# Patient Record
Sex: FEMALE
[De-identification: names, ages, dates, MRNs, and addresses within clinical notes are randomized per-mention and may not be internally consistent; named-entity substitution may affect disease eponyms.]

---

## 2024-02-20 PROBLEM — Z00.00 ENCOUNTER FOR PREVENTIVE HEALTH EXAMINATION: Status: ACTIVE | Noted: 2024-02-20

## 2024-02-22 ENCOUNTER — APPOINTMENT (OUTPATIENT)
Dept: ORTHOPEDIC SURGERY | Facility: CLINIC | Age: 89
End: 2024-02-22
Payer: MEDICARE

## 2024-02-22 VITALS
HEIGHT: 65 IN | SYSTOLIC BLOOD PRESSURE: 168 MMHG | DIASTOLIC BLOOD PRESSURE: 90 MMHG | HEART RATE: 105 BPM | WEIGHT: 123 LBS | BODY MASS INDEX: 20.49 KG/M2

## 2024-02-22 DIAGNOSIS — M25.562 PAIN IN LEFT KNEE: ICD-10-CM

## 2024-02-22 DIAGNOSIS — M89.9 DISORDER OF BONE, UNSPECIFIED: ICD-10-CM

## 2024-02-22 DIAGNOSIS — Q78.6 MULTIPLE CONGENITAL EXOSTOSES: ICD-10-CM

## 2024-02-22 PROCEDURE — 73564 X-RAY EXAM KNEE 4 OR MORE: CPT | Mod: LT

## 2024-02-22 PROCEDURE — 99204 OFFICE O/P NEW MOD 45 MIN: CPT

## 2024-02-23 PROBLEM — Q78.6 MULTIPLE HEREDITARY EXOSTOSES: Status: ACTIVE | Noted: 2024-02-23

## 2024-02-23 NOTE — PHYSICAL EXAM
[General Appearance - Well-Appearing] : Well appearing [General Appearance - Well Nourished] : well nourished [Oriented To Time, Place, And Person] : Oriented to person, place, and time [Sclera] : the sclera and conjunctiva were normal [Neck Cervical Mass (___cm)] : no neck mass was observed [Heart Rate And Rhythm] : heart rate was normal and rhythm regular [] : No respiratory distress [Abdomen Soft] : Soft [Normal Station and Gait] : gait and station were normal [Normal] : Palpable DP and PT pulses, warm and pink with brisk capillary refill [FreeTextEntry1] : On exam the patient is now able to  good balance.  She has good range of motion of 0 to 130 degrees of both knees.  She has palpable masses in both knees as well as the healed scars from previous excisions.  She has a mass in the posterolateral portion just behind and lateral to the proximal fibular shaft/neck.  There is no tenderness now.  There is nothing related to the peroneal nerve over this.  There is broken and not moving.  It is currently nontender. [Tenderness] : no tenderness [Skin Changes - Describe changes:] : No skin changes noted

## 2024-02-23 NOTE — DISCUSSION/SUMMARY
[All Questions Answered] : Patient (and family) had all questions answered to an agreeable level of satisfaction [Interested in Proceeding] : Patient (and family) expressed understanding and interest in proceeding with the plan as outlined [de-identified] : Patient has findings consistent with multiple hereditary exostosis.  The area where she had pain was posterolateral on the fibula.  Currently her pain has gotten better.  I think this could time however it does not appear like there is any growth or inflammation surrounding it.  Assuming it does not get bigger or problematic then I would let her get back to regular activity.  Should she have any of the areas that seem to get bigger or painful we will investigate further for secondary chondrosarcomatous degeneration.  Follow-up again in 6 months or as needed.  If imaging or pathology/biopsy was ordered, the patient was told to make an appointment to review findings right after all imaging is completed.  We discussed risks, benefits and alternatives. Rationale of care was reviewed and all questions were answered. Patient (and family) had all questions answered to her degree of the level of satisfaction. Patient (and family) expressed understanding and interest in proceeding with the plan as outlined.     This note was done with a voice recognition transcription software and any typos are related to this rather than medical error. Surgical risks reviewed. Patient (and family) had all questions answered to an agreeable level of satisfaction. Patient (and family) expressed understanding and interest in proceeding with the plan as outlined.

## 2024-02-23 NOTE — DATA REVIEWED
[Imaging Present] : Present [de-identified] : X-ray reviewed AP lateral and obliques of left knees show mild degenerative changes .  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  MRI scan from February 14, 2024 shows benign pedunculated osteochondroma coming from the proximal tibia and proximal fibula without cartilage Or secondary bursa formation

## 2024-02-23 NOTE — HISTORY OF PRESENT ILLNESS
US at BS.   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  She started having some left knee pain that is very bothersome in the front of the knee however it did get better.  She had some pain posterolaterally.  She was seen by another doctor who found a mass and was sent to me for further evaluation.  The pain has gotten better recently.  She is able to walk better.  She does have a son who has known multiple polyps including previous surgery as well as grandchildren with similar symptoms.

## 2025-02-19 ENCOUNTER — INPATIENT (INPATIENT)
Facility: HOSPITAL | Age: 89
LOS: 0 days | Discharge: HOME HEALTH | DRG: 552 | End: 2025-02-20
Attending: STUDENT IN AN ORGANIZED HEALTH CARE EDUCATION/TRAINING PROGRAM | Admitting: STUDENT IN AN ORGANIZED HEALTH CARE EDUCATION/TRAINING PROGRAM
Payer: MEDICARE

## 2025-02-19 VITALS
HEART RATE: 98 BPM | DIASTOLIC BLOOD PRESSURE: 87 MMHG | HEIGHT: 65 IN | OXYGEN SATURATION: 99 % | WEIGHT: 115.08 LBS | SYSTOLIC BLOOD PRESSURE: 187 MMHG | RESPIRATION RATE: 20 BRPM | TEMPERATURE: 98 F

## 2025-02-19 LAB
ALBUMIN SERPL ELPH-MCNC: 4 G/DL — SIGNIFICANT CHANGE UP (ref 3.3–5)
ALP SERPL-CCNC: 114 U/L — SIGNIFICANT CHANGE UP (ref 40–120)
ALT FLD-CCNC: 14 U/L — SIGNIFICANT CHANGE UP (ref 10–45)
ANION GAP SERPL CALC-SCNC: 14 MMOL/L — SIGNIFICANT CHANGE UP (ref 5–17)
AST SERPL-CCNC: 18 U/L — SIGNIFICANT CHANGE UP (ref 10–40)
BASOPHILS # BLD AUTO: 0.01 K/UL — SIGNIFICANT CHANGE UP (ref 0–0.2)
BASOPHILS NFR BLD AUTO: 0.1 % — SIGNIFICANT CHANGE UP (ref 0–2)
BILIRUB SERPL-MCNC: 0.6 MG/DL — SIGNIFICANT CHANGE UP (ref 0.2–1.2)
BUN SERPL-MCNC: 14 MG/DL — SIGNIFICANT CHANGE UP (ref 7–23)
CALCIUM SERPL-MCNC: 9.1 MG/DL — SIGNIFICANT CHANGE UP (ref 8.4–10.5)
CHLORIDE SERPL-SCNC: 102 MMOL/L — SIGNIFICANT CHANGE UP (ref 96–108)
CO2 SERPL-SCNC: 21 MMOL/L — LOW (ref 22–31)
CREAT SERPL-MCNC: 0.5 MG/DL — SIGNIFICANT CHANGE UP (ref 0.5–1.3)
EGFR: 90 ML/MIN/1.73M2 — SIGNIFICANT CHANGE UP
EOSINOPHIL # BLD AUTO: 0 K/UL — SIGNIFICANT CHANGE UP (ref 0–0.5)
EOSINOPHIL NFR BLD AUTO: 0 % — SIGNIFICANT CHANGE UP (ref 0–6)
GLUCOSE SERPL-MCNC: 130 MG/DL — HIGH (ref 70–99)
HCT VFR BLD CALC: 42.8 % — SIGNIFICANT CHANGE UP (ref 34.5–45)
HGB BLD-MCNC: 14.2 G/DL — SIGNIFICANT CHANGE UP (ref 11.5–15.5)
IMM GRANULOCYTES NFR BLD AUTO: 0.5 % — SIGNIFICANT CHANGE UP (ref 0–0.9)
LYMPHOCYTES # BLD AUTO: 0.76 K/UL — LOW (ref 1–3.3)
LYMPHOCYTES # BLD AUTO: 8.9 % — LOW (ref 13–44)
MCHC RBC-ENTMCNC: 29.2 PG — SIGNIFICANT CHANGE UP (ref 27–34)
MCHC RBC-ENTMCNC: 33.2 G/DL — SIGNIFICANT CHANGE UP (ref 32–36)
MCV RBC AUTO: 87.9 FL — SIGNIFICANT CHANGE UP (ref 80–100)
MONOCYTES # BLD AUTO: 0.21 K/UL — SIGNIFICANT CHANGE UP (ref 0–0.9)
MONOCYTES NFR BLD AUTO: 2.5 % — SIGNIFICANT CHANGE UP (ref 2–14)
NEUTROPHILS # BLD AUTO: 7.54 K/UL — HIGH (ref 1.8–7.4)
NEUTROPHILS NFR BLD AUTO: 88 % — HIGH (ref 43–77)
NRBC BLD AUTO-RTO: 0 /100 WBCS — SIGNIFICANT CHANGE UP (ref 0–0)
PLATELET # BLD AUTO: 357 K/UL — SIGNIFICANT CHANGE UP (ref 150–400)
POTASSIUM SERPL-MCNC: 4 MMOL/L — SIGNIFICANT CHANGE UP (ref 3.5–5.3)
POTASSIUM SERPL-SCNC: 4 MMOL/L — SIGNIFICANT CHANGE UP (ref 3.5–5.3)
PROT SERPL-MCNC: 7.1 G/DL — SIGNIFICANT CHANGE UP (ref 6–8.3)
RBC # BLD: 4.87 M/UL — SIGNIFICANT CHANGE UP (ref 3.8–5.2)
RBC # FLD: 13.5 % — SIGNIFICANT CHANGE UP (ref 10.3–14.5)
SODIUM SERPL-SCNC: 137 MMOL/L — SIGNIFICANT CHANGE UP (ref 135–145)
WBC # BLD: 8.56 K/UL — SIGNIFICANT CHANGE UP (ref 3.8–10.5)
WBC # FLD AUTO: 8.56 K/UL — SIGNIFICANT CHANGE UP (ref 3.8–10.5)

## 2025-02-19 RX ORDER — LIDOCAINE HYDROCHLORIDE 20 MG/ML
1 JELLY TOPICAL ONCE
Refills: 0 | Status: COMPLETED | OUTPATIENT
Start: 2025-02-19 | End: 2025-02-19

## 2025-02-19 RX ORDER — KETOROLAC TROMETHAMINE 30 MG/ML
15 INJECTION, SOLUTION INTRAMUSCULAR; INTRAVENOUS ONCE
Refills: 0 | Status: DISCONTINUED | OUTPATIENT
Start: 2025-02-19 | End: 2025-02-19

## 2025-02-19 RX ORDER — OXYCODONE HYDROCHLORIDE 30 MG/1
5 TABLET ORAL ONCE
Refills: 0 | Status: DISCONTINUED | OUTPATIENT
Start: 2025-02-19 | End: 2025-02-19

## 2025-02-19 RX ORDER — ACETAMINOPHEN 500 MG/5ML
1000 LIQUID (ML) ORAL ONCE
Refills: 0 | Status: COMPLETED | OUTPATIENT
Start: 2025-02-19 | End: 2025-02-19

## 2025-02-19 RX ORDER — DEXAMETHASONE 0.5 MG/1
9 TABLET ORAL ONCE
Refills: 0 | Status: COMPLETED | OUTPATIENT
Start: 2025-02-19 | End: 2025-02-19

## 2025-02-19 RX ADMIN — OXYCODONE HYDROCHLORIDE 5 MILLIGRAM(S): 30 TABLET ORAL at 23:08

## 2025-02-19 RX ADMIN — KETOROLAC TROMETHAMINE 15 MILLIGRAM(S): 30 INJECTION, SOLUTION INTRAMUSCULAR; INTRAVENOUS at 19:55

## 2025-02-19 RX ADMIN — DEXAMETHASONE 9 MILLIGRAM(S): 0.5 TABLET ORAL at 21:18

## 2025-02-19 RX ADMIN — Medication 400 MILLIGRAM(S): at 19:52

## 2025-02-19 NOTE — H&P ADULT - ASSESSMENT
89F former smoker (24 pack-years, quit age 42), PMH HLD, OA, sciatica, L femur fracture s/p repair (8 years ago), pessary placement, recurrent UTI (on daily cephalexin PPx) p/w L lumbar back pain radiating to L gluteus and lateral LLE

## 2025-02-19 NOTE — H&P ADULT - PROBLEM SELECTOR PLAN 5
- f/u screening EKG and repeat CBC w/ diff   - SCD pending CTH re VTE PPx, consider chemical VTE PPx if wnl  - precautions above  - TLC diet pending RN dysphagia screen  - disposition pending course, PT evaluation

## 2025-02-19 NOTE — ED PROCEDURE NOTE - ATTENDING CONTRIBUTION TO CARE
I, EM Attending, Perez Miranda was present for and supervised the entirety of the procedure performed by the Resident/Fellow Physician or SUSIE.

## 2025-02-19 NOTE — H&P ADULT - PROBLEM SELECTOR PLAN 1
no numbness (including saddle anesthesia), tingling, weakness, urinary/fecal incontinence, and currently not in pain at rest  XR L spine/pelvis prelim w/o acute abn  s/p L transgluteal sciatic nerve block under US guidance   - PT evaluation  - pain control prn  - fall precaution  - monitor neuro checks, sx/exam to determine need for further imaging  - BP elevation presume related to pain, ctm off antihypertensives at this time no numbness (including saddle anesthesia), tingling, weakness, urinary/fecal incontinence, and currently not in pain at rest. Reports that she has been able to ambulate.  XR L spine/pelvis prelim w/o acute abn  s/p L transgluteal sciatic nerve block under US guidance   - PT evaluation  - pain control prn  - fall precaution  - monitor neuro checks, sx/exam to determine need for further imaging  - BP elevation presume related to pain, ctm off antihypertensives at this time

## 2025-02-19 NOTE — ED PROVIDER NOTE - ATTENDING CONTRIBUTION TO CARE
I, Perez Miranda MD, Emergency Medicine Attending Physician, personally saw and examined the patient and I personally made/approve the management plan and take responsibility for the patient management.    MDM: 89-year-old female with history of hyperlipidemia, who presents with left lower back pain with radiation to the left gluteus and left lower extremity for the last 5 days.  Patient was bending down to help her  prior to onset of symptoms.  Patient has been trying Motrin, Tylenol, muscle relaxants with no significant improvement.  Patient went to [facility] yesterday and had x-rays performed, and was discharged.    ROS: denies fevers, chills, weight loss, pain worse at night, urinary retention, incontinence of bowel or bladder, saddle anesthesia, lower extremity weakness or numbness, and no recent trauma or falls.    On examination, patient with elevated blood pressure but otherwise stable vitals, nontoxic, uncomfortable appearing. Cardiac examination RRR, lungs CTAB with no W/R/R.  ABD: Abdomen soft, non-tender and non-distended, no rebound, no guarding, no rigidity. No CVA tenderness.  L-SPINE: There is no erythema, deformity, swelling. Palpation with mild L5 tenderness, but without midline spinal tenderness or step off.  (+) Tender to the left SI joint and the left gluteal region.  L2-S2 with normal 5/5 motor strength and normal sensation.  No hyperreflexia.  (+) Antalgic gait.  Neurovascularly intact in all 4 extremities with 5/5 strength, normal sensation, equal pulses and brisk capillary refill, soft compartments.  Cranial nerves III-XII intact, no pronator drift, normal speech.  Skin with no rash.    Patient with atraumatic back pain with no red flags or neuro deficits. Differential includes but is not limited to lumbar radiculopathy, spondylosis, disc herniation, degenerative disc disease.  However, not consistent with presentation for epidural hematoma, or infectious etiology including epidural abscess, osteomyelitis/discitis, and no evidence of cauda equina or cord compression.    Will obtain labs to evaluate for hematologic disorder, metabolic derangements, hepatic and renal function.  Will obtain x-ray of lumbar spine and pelvis.  Pain control reassess. I, Perez Miranda MD, Emergency Medicine Attending Physician, personally saw and examined the patient and I personally made/approve the management plan and take responsibility for the patient management.    MDM: 89-year-old female with history of hyperlipidemia, who presents with left lower back pain with radiation to the left gluteus and left lower extremity for the last 5 days.  Patient was bending down to help her  prior to onset of symptoms.  Patient has been trying Motrin, Tylenol, muscle relaxants with no significant improvement.  Patient went to [facility] yesterday and had x-rays performed, and was discharged.    ROS: denies fevers, chills, weight loss, pain worse at night, urinary retention, incontinence of bowel or bladder, saddle anesthesia, lower extremity weakness or numbness, and no recent trauma or falls.    On examination, patient with elevated blood pressure but otherwise stable vitals, nontoxic, uncomfortable appearing. Cardiac examination RRR, lungs CTAB with no W/R/R.  ABD: Abdomen soft, non-tender and non-distended, no rebound, no guarding, no rigidity. No CVA tenderness.  L-SPINE: There is no erythema, deformity, swelling. Palpation with mild L5 tenderness, but without midline spinal tenderness or step off.  (+) Tender to the left SI joint and the left gluteal region.  L2-S2 with normal 5/5 motor strength and normal sensation.  No hyperreflexia.  (+) Antalgic gait.  Neurovascularly intact in all 4 extremities with 5/5 strength, normal sensation, equal pulses and brisk capillary refill, soft compartments.  Cranial nerves III-XII intact, no pronator drift, normal speech.  Skin with no rash.    Patient with atraumatic back pain with no red flags or neuro deficits. Differential includes but is not limited to lumbar radiculopathy, spondylosis, disc herniation, degenerative disc disease.  However, not consistent with presentation for epidural hematoma, or infectious etiology including epidural abscess, osteomyelitis/discitis, and no evidence of cauda equina or cord compression.    Will obtain labs to evaluate for hematologic disorder, metabolic derangements, hepatic and renal function.  Will obtain x-ray of lumbar spine and pelvis.  Pain control reassess.    Labs with no leukocytosis, electrolytes nonactionable, x-ray imaging showed no acute fracture or dislocation of the lumbar spine.  Pelvis x-ray with no acute fracture or dislocation.    Patient with persistent pain, ultrasound-guided sciatic nerve block performed, see procedure note.  Neurovascular intact distally.  However patient persists to have significant pain and inability to ambulate.    The patient will need to be admitted to the hospital for continued evaluation and management.  Discussed with the accepting physician regarding the initial presentation, diagnostic studies, treatments given in the ED, and current plan of care.  The patient was accepted by and endorsed to the medicine team.

## 2025-02-19 NOTE — H&P ADULT - NSHPADDITIONALINFOADULT_GEN_ALL_CORE
Urgent medical problems addressed overnight, comprehensive care to be assumed by day colleagues during course of admission.    Please refer to detailed radiology reports above, provide to patient upon discharge for presentation to PCP at which time abnormal findings not addressed inpatient can be followed up.    Case assigned to me overnight by hospitalist in charge

## 2025-02-19 NOTE — ED PROVIDER NOTE - PHYSICAL EXAMINATION
there is mild ttp to the midline L5 region, moderate ttp to Left SIJ and L gluteal region. patient ambulatory with antalgic gait  abdomen is soft w/o ttp rebound or guarding  5/5 UE/LE motor strength, SILT

## 2025-02-19 NOTE — H&P ADULT - NSHPPHYSICALEXAM_GEN_ALL_CORE
PHYSICAL EXAM:  GENERAL: NAD, well-groomed, well-developed, pleasant to interview  HEAD: Atraumatic, Normocephalic  EYES: PERRL, conjunctiva and sclera clear  ENMT: No tonsillar erythema, exudates, or enlargement; Moist mucous membranes  NECK: Supple w/o JVD, tenderness, or pain w/ ROM  NERVOUS SYSTEM: Alert & Oriented X4, Good concentration though repeating self/forgetful at times; Motor Strength 5/5 B/L upper and lower extremities. Sensation equal/intact b/l UE and LE. CN II-XII grossly intact.   CHEST/LUNG: Clear to auscultation bilaterally; No rales, rhonchi, wheezing, or rubs  HEART: Regular rate and rhythm; No murmurs, rubs, or gallops  ABDOMEN: Soft, Nontender, Nondistended; Bowel sounds present. No guarding, rebound tenderness, or rigidity.  EXTREMITIES: 2+ Peripheral Pulses, No edema/warmth/erythema/tenderness to palpation b/l LE PHYSICAL EXAM:  GENERAL: NAD, well-groomed, well-developed, pleasant to interview  HEAD: Atraumatic, Normocephalic  EYES: PERRL, conjunctiva and sclera clear  ENMT: No tonsillar erythema, exudates, or enlargement; Moist mucous membranes  NECK: Supple w/o JVD, tenderness, or pain w/ ROM  NERVOUS SYSTEM: Alert & Oriented X4, Good concentration though repeating self/forgetful at times; Motor Strength 5/5 B/L upper and lower extremities. Sensation equal/intact b/l UE and LE. CN II-XII grossly intact.   CHEST/LUNG: Clear to auscultation bilaterally; No rales, rhonchi, wheezing, or rubs  HEART: Regular rate and rhythm; No murmurs, rubs, or gallops  ABDOMEN: Soft, Nontender, Nondistended; Bowel sounds present. No guarding, rebound tenderness, or rigidity.  EXTREMITIES: 2+ Peripheral Pulses, No edema/warmth/erythema/tenderness to palpation b/l LE  MUSCULOSKELETAL: no spinal/paraspinal, L hip, or L lateral thigh tenderness to palpation (chaperoned by bedside RN in ED w/ pt permission) and w/o ecchymosis/hematoma

## 2025-02-19 NOTE — ED PROVIDER NOTE - CLINICAL SUMMARY MEDICAL DECISION MAKING FREE TEXT BOX
Fellow Dominic: atraumatic back pain likely sciatica and SIJ pain. Will plan for pain meds, xrays, consider nerve block. may require admission if unable to control the pain

## 2025-02-19 NOTE — ED PROCEDURE NOTE - PROCEDURE ADDITIONAL DETAILS
L transgluteal sciatic nerve block performed under US guidance For Left sided sciatica pain. administered 10 mg dexamethasone + ropivicaine 15 cc. tolerated procedure well. no complications. POCUS MSK Nerve Block: L transgluteal sciatic nerve block performed under US guidance For Left sided sciatica pain. administered 10 mg dexamethasone + ropivicaine 15 cc. tolerated procedure well. no complications.

## 2025-02-19 NOTE — ED PROVIDER NOTE - OBJECTIVE STATEMENT
89F here with L lower back pain and L gluteal pain for the last 5 days. pt here because pain preventing her from sleeping. no extremity weakness/numbness, saddle anesthesia, bowel or bladder incontinence, trauma, steroid/ac use. pt has taken muscle relaxants, ibuprofen and apap with poor pain control. went to an OSH where xrays were reportedly normal

## 2025-02-19 NOTE — ED ADULT NURSE NOTE - OBJECTIVE STATEMENT
89 y.o f A&Ox4 PSH of broken left femur presents to ED complaining of back pain. Pt states that she has been having severe left sided back pain that radiates down her left leg x several days. Pt states that she has not been able to sleep for about 5 days due to the pain. Pt states that Tylenol and Motrin and muscle relaxer has not helped her at all. Pt VSS at this time.

## 2025-02-19 NOTE — H&P ADULT - NSICDXPASTMEDICALHX_GEN_ALL_CORE_FT
PAST MEDICAL HISTORY:  H/O sciatica     HLD (hyperlipidemia)     Osteoarthritis      PAST MEDICAL HISTORY:  H/O sciatica     HLD (hyperlipidemia)     Osteoarthritis     Presence of pessary     Recurrent UTI

## 2025-02-19 NOTE — H&P ADULT - HISTORY OF PRESENT ILLNESS
89F former smoker (24 pack-years, quit age 42), PMH HLD, OA, sciatica, L femur fracture, pessary placement, p/w L lower back pain radiating to L gluteus and lateral LLE x 5 days after bending. AOx4 though at times repeats self/forgetful, reports baseline health when 5 days ago she bent over to  object and immediately began to experience pain in L lower back radiated to L gluteus and lateral LLE, worse w/ movement, refractory to OTC ibuprofen. Relays presentation to OSH ED ([place]) at which time XR w/o pathology and discharged home. Not endorsing fever, chills, HA, lightheadedness, CP, palpitations, cough, SOB, abd pain, n/v/d, constipation, melena, hematochezia, dysuria, hematuria, urinary frequency or malodor, urinary or fecal incontinence, numbness (including saddle anesthesia), tingling, or weakness or other complaint. Currently not in pain in ED stretcher.    BP 180s/80s    neutrophil predominance w/ lymphocytopenia    XR L spine (prelim):  - No acute fracture or dislocation.  - If clinical suspicion for acute lumbar spine fracture, recommend obtaining noncontrast CT evaluation    XR pelvis (prelim):   - No acute fracture or dislocation.    s/p ofirmev 1g, toradol 15mg IV, oxycodone 5mg IR PO, lido 4% patch, decadron 9mg IM     ED performed L transgluteal sciatic nerve block under US guidance  89F former smoker (24 pack-years, quit age 42), PMH HLD, OA, sciatica, L femur fracture s/p repair (8 years ago), pessary placement, recurrent UTI (on daily cephalexin PPx) p/w L lower back pain radiating to L gluteus and lateral LLE x 5 days after bending. AOx4 though at times repeats self/forgetful, reports baseline health when 5 days ago she bent over to  object and immediately began to experience pain in L lower back radiated to L gluteus and lateral LLE, worse w/ movement, refractory to OTC ibuprofen. Relays presentation to OSH ED ([place]) at which time XR w/o pathology and discharged home. Not endorsing fever, chills, HA, lightheadedness, CP, palpitations, cough, SOB, abd pain, n/v/d, constipation, melena, hematochezia, dysuria, hematuria, urinary frequency or malodor, urinary or fecal incontinence, numbness (including saddle anesthesia), tingling, or weakness or other complaint. Currently not in pain in ED stretcher.     Contacted son Paulo for collateral who confirms above and offers that patient being forgetful/repeating self is not baseline, he attributes to her not sleeping well/at all x 5 days d/t pain. Patient agrees.    BP 180s/80s    neutrophil predominance w/ lymphocytopenia    XR L spine (prelim):  - No acute fracture or dislocation.  - If clinical suspicion for acute lumbar spine fracture, recommend obtaining noncontrast CT evaluation    XR pelvis (prelim):   - No acute fracture or dislocation.    s/p ofirmev 1g, toradol 15mg IV, oxycodone 5mg IR PO, lido 4% patch, decadron 9mg IM     ED performed L transgluteal sciatic nerve block under US guidance  89F former smoker (24 pack-years, quit age 42), PMH HLD, OA, sciatica, L femur fracture s/p repair (8 years ago), pessary placement, recurrent UTI (on daily cephalexin PPx) p/w L lower back pain radiating to L gluteus and lateral LLE x 5 days after bending. AOx4 though at times repeats self/forgetful, reports baseline health when 5 days ago she bent over to  object and immediately began to experience pain in L lower back radiated to L gluteus and lateral LLE, worse w/ movement, refractory to OTC ibuprofen, has been able to ambulate. Relays presentation to OSH ED ([place]) at which time XR w/o pathology and discharged home. Not endorsing fever, chills, HA, lightheadedness, CP, palpitations, cough, SOB, abd pain, n/v/d, constipation, melena, hematochezia, dysuria, hematuria, urinary frequency or malodor, urinary or fecal incontinence, numbness (including saddle anesthesia), tingling, or weakness or other complaint. Currently not in pain in ED stretcher.     Contacted son Paulo for collateral who confirms above and offers that patient being forgetful/repeating self is not baseline, he attributes to her not sleeping well/at all x 5 days d/t pain. Patient agrees.    BP 180s/80s    neutrophil predominance w/ lymphocytopenia    XR L spine (prelim):  - No acute fracture or dislocation.  - If clinical suspicion for acute lumbar spine fracture, recommend obtaining noncontrast CT evaluation    XR pelvis (prelim):   - No acute fracture or dislocation.    s/p ofirmev 1g, toradol 15mg IV, oxycodone 5mg IR PO, lido 4% patch, decadron 9mg IM     ED performed L transgluteal sciatic nerve block under US guidance

## 2025-02-19 NOTE — H&P ADULT - PROBLEM SELECTOR PLAN 2
more forgetful/repeating self which is new per son/patient, attributed by them to poor sleep x 5 days   w/o focal deficit  - CTH NC to evaluate  - neuro checks  - ctm with fall/aspiration precautions

## 2025-02-19 NOTE — ED PROVIDER NOTE - PROGRESS NOTE DETAILS
Santos Alfaro. pt did not have pain relief with the transgluteal nerve block. with difficulties ambulating and poor pain control. will admit for pain control and PT. Pt's son Thompson Mccarthy can be reached at [phone] for any questions or updates

## 2025-02-20 ENCOUNTER — TRANSCRIPTION ENCOUNTER (OUTPATIENT)
Age: 89
End: 2025-02-20

## 2025-02-20 VITALS
DIASTOLIC BLOOD PRESSURE: 78 MMHG | SYSTOLIC BLOOD PRESSURE: 136 MMHG | HEART RATE: 80 BPM | TEMPERATURE: 97 F | OXYGEN SATURATION: 96 % | RESPIRATION RATE: 18 BRPM

## 2025-02-20 DIAGNOSIS — N39.0 URINARY TRACT INFECTION, SITE NOT SPECIFIED: ICD-10-CM

## 2025-02-20 DIAGNOSIS — G93.40 ENCEPHALOPATHY, UNSPECIFIED: ICD-10-CM

## 2025-02-20 DIAGNOSIS — Z87.81 PERSONAL HISTORY OF (HEALED) TRAUMATIC FRACTURE: Chronic | ICD-10-CM

## 2025-02-20 DIAGNOSIS — Z29.9 ENCOUNTER FOR PROPHYLACTIC MEASURES, UNSPECIFIED: ICD-10-CM

## 2025-02-20 DIAGNOSIS — M54.50 LOW BACK PAIN, UNSPECIFIED: ICD-10-CM

## 2025-02-20 DIAGNOSIS — E78.5 HYPERLIPIDEMIA, UNSPECIFIED: ICD-10-CM

## 2025-02-20 DIAGNOSIS — M54.16 RADICULOPATHY, LUMBAR REGION: ICD-10-CM

## 2025-02-20 LAB
ANION GAP SERPL CALC-SCNC: 15 MMOL/L — SIGNIFICANT CHANGE UP (ref 5–17)
BASOPHILS # BLD AUTO: 0.02 K/UL — SIGNIFICANT CHANGE UP (ref 0–0.2)
BASOPHILS NFR BLD AUTO: 0.2 % — SIGNIFICANT CHANGE UP (ref 0–2)
BUN SERPL-MCNC: 15 MG/DL — SIGNIFICANT CHANGE UP (ref 7–23)
CALCIUM SERPL-MCNC: 8.8 MG/DL — SIGNIFICANT CHANGE UP (ref 8.4–10.5)
CHLORIDE SERPL-SCNC: 101 MMOL/L — SIGNIFICANT CHANGE UP (ref 96–108)
CO2 SERPL-SCNC: 21 MMOL/L — LOW (ref 22–31)
CREAT SERPL-MCNC: 0.45 MG/DL — LOW (ref 0.5–1.3)
EGFR: 92 ML/MIN/1.73M2 — SIGNIFICANT CHANGE UP
EOSINOPHIL # BLD AUTO: 0 K/UL — SIGNIFICANT CHANGE UP (ref 0–0.5)
EOSINOPHIL NFR BLD AUTO: 0 % — SIGNIFICANT CHANGE UP (ref 0–6)
GLUCOSE SERPL-MCNC: 126 MG/DL — HIGH (ref 70–99)
HCT VFR BLD CALC: 44.6 % — SIGNIFICANT CHANGE UP (ref 34.5–45)
HGB BLD-MCNC: 14.7 G/DL — SIGNIFICANT CHANGE UP (ref 11.5–15.5)
IMM GRANULOCYTES NFR BLD AUTO: 0.5 % — SIGNIFICANT CHANGE UP (ref 0–0.9)
LYMPHOCYTES # BLD AUTO: 0.61 K/UL — LOW (ref 1–3.3)
LYMPHOCYTES # BLD AUTO: 5.6 % — LOW (ref 13–44)
MAGNESIUM SERPL-MCNC: 2 MG/DL — SIGNIFICANT CHANGE UP (ref 1.6–2.6)
MCHC RBC-ENTMCNC: 29.3 PG — SIGNIFICANT CHANGE UP (ref 27–34)
MCHC RBC-ENTMCNC: 33 G/DL — SIGNIFICANT CHANGE UP (ref 32–36)
MCV RBC AUTO: 89 FL — SIGNIFICANT CHANGE UP (ref 80–100)
MONOCYTES # BLD AUTO: 0.33 K/UL — SIGNIFICANT CHANGE UP (ref 0–0.9)
MONOCYTES NFR BLD AUTO: 3 % — SIGNIFICANT CHANGE UP (ref 2–14)
NEUTROPHILS # BLD AUTO: 9.92 K/UL — HIGH (ref 1.8–7.4)
NEUTROPHILS NFR BLD AUTO: 90.7 % — HIGH (ref 43–77)
NRBC BLD AUTO-RTO: 0 /100 WBCS — SIGNIFICANT CHANGE UP (ref 0–0)
PHOSPHATE SERPL-MCNC: 3.5 MG/DL — SIGNIFICANT CHANGE UP (ref 2.5–4.5)
PLATELET # BLD AUTO: 362 K/UL — SIGNIFICANT CHANGE UP (ref 150–400)
POTASSIUM SERPL-MCNC: 3.9 MMOL/L — SIGNIFICANT CHANGE UP (ref 3.5–5.3)
POTASSIUM SERPL-SCNC: 3.9 MMOL/L — SIGNIFICANT CHANGE UP (ref 3.5–5.3)
RBC # BLD: 5.01 M/UL — SIGNIFICANT CHANGE UP (ref 3.8–5.2)
RBC # FLD: 13.3 % — SIGNIFICANT CHANGE UP (ref 10.3–14.5)
SODIUM SERPL-SCNC: 137 MMOL/L — SIGNIFICANT CHANGE UP (ref 135–145)
WBC # BLD: 10.93 K/UL — HIGH (ref 3.8–10.5)
WBC # FLD AUTO: 10.93 K/UL — HIGH (ref 3.8–10.5)

## 2025-02-20 RX ORDER — ATORVASTATIN CALCIUM 80 MG/1
1 TABLET, FILM COATED ORAL
Refills: 0 | DISCHARGE

## 2025-02-20 RX ORDER — ACETAMINOPHEN 500 MG/5ML
1000 LIQUID (ML) ORAL ONCE
Refills: 0 | Status: COMPLETED | OUTPATIENT
Start: 2025-02-20 | End: 2025-02-20

## 2025-02-20 RX ORDER — INFLUENZA A VIRUS A/IDAHO/07/2018 (H1N1) ANTIGEN (MDCK CELL DERIVED, PROPIOLACTONE INACTIVATED, INFLUENZA A VIRUS A/INDIANA/08/2018 (H3N2) ANTIGEN (MDCK CELL DERIVED, PROPIOLACTONE INACTIVATED), INFLUENZA B VIRUS B/SINGAPORE/INFTT-16-0610/2016 ANTIGEN (MDCK CELL DERIVED, PROPIOLACTONE INACTIVATED), INFLUENZA B VIRUS B/IOWA/06/2017 ANTIGEN (MDCK CELL DERIVED, PROPIOLACTONE INACTIVATED) 15; 15; 15; 15 UG/.5ML; UG/.5ML; UG/.5ML; UG/.5ML
0.5 INJECTION, SUSPENSION INTRAMUSCULAR ONCE
Refills: 0 | Status: DISCONTINUED | OUTPATIENT
Start: 2025-02-20 | End: 2025-02-20

## 2025-02-20 RX ORDER — ACETAMINOPHEN 500 MG/5ML
650 LIQUID (ML) ORAL EVERY 6 HOURS
Refills: 0 | Status: DISCONTINUED | OUTPATIENT
Start: 2025-02-20 | End: 2025-02-20

## 2025-02-20 RX ORDER — OXYBUTYNIN CHLORIDE 5 MG/1
5 TABLET, FILM COATED, EXTENDED RELEASE ORAL
Refills: 0 | Status: DISCONTINUED | OUTPATIENT
Start: 2025-02-20 | End: 2025-02-20

## 2025-02-20 RX ORDER — OXYCODONE HYDROCHLORIDE 30 MG/1
1 TABLET ORAL
Qty: 12 | Refills: 0
Start: 2025-02-20 | End: 2025-02-22

## 2025-02-20 RX ORDER — ATORVASTATIN CALCIUM 80 MG/1
10 TABLET, FILM COATED ORAL AT BEDTIME
Refills: 0 | Status: DISCONTINUED | OUTPATIENT
Start: 2025-02-20 | End: 2025-02-20

## 2025-02-20 RX ORDER — MIRABEGRON 50 MG/1
1 TABLET, FILM COATED, EXTENDED RELEASE ORAL
Refills: 0 | DISCHARGE

## 2025-02-20 RX ORDER — CEPHALEXIN 250 MG/1
1 CAPSULE ORAL
Refills: 0 | DISCHARGE

## 2025-02-20 RX ORDER — ACETAMINOPHEN 500 MG/5ML
975 LIQUID (ML) ORAL EVERY 8 HOURS
Refills: 0 | Status: DISCONTINUED | OUTPATIENT
Start: 2025-02-20 | End: 2025-02-20

## 2025-02-20 RX ORDER — ACETAMINOPHEN 500 MG/5ML
3 LIQUID (ML) ORAL
Qty: 0 | Refills: 0 | DISCHARGE
Start: 2025-02-20

## 2025-02-20 RX ORDER — CEPHALEXIN 250 MG/1
250 CAPSULE ORAL DAILY
Refills: 0 | Status: DISCONTINUED | OUTPATIENT
Start: 2025-02-20 | End: 2025-02-20

## 2025-02-20 RX ADMIN — Medication 1000 MILLIGRAM(S): at 01:29

## 2025-02-20 RX ADMIN — Medication 650 MILLIGRAM(S): at 05:14

## 2025-02-20 RX ADMIN — OXYBUTYNIN CHLORIDE 5 MILLIGRAM(S): 5 TABLET, FILM COATED, EXTENDED RELEASE ORAL at 05:14

## 2025-02-20 RX ADMIN — KETOROLAC TROMETHAMINE 15 MILLIGRAM(S): 30 INJECTION, SOLUTION INTRAMUSCULAR; INTRAVENOUS at 01:29

## 2025-02-20 RX ADMIN — OXYCODONE HYDROCHLORIDE 5 MILLIGRAM(S): 30 TABLET ORAL at 01:29

## 2025-02-20 RX ADMIN — Medication 400 MILLIGRAM(S): at 01:14

## 2025-02-20 RX ADMIN — CEPHALEXIN 250 MILLIGRAM(S): 250 CAPSULE ORAL at 12:24

## 2025-02-20 NOTE — DISCHARGE NOTE PROVIDER - ATTENDING DISCHARGE PHYSICAL EXAMINATION:
GENERAL: NAD, well-groomed, well-developed, pleasant to interview  NERVOUS SYSTEM: Alert & Oriented X4, Good concentration though repeating self/forgetful at times; Motor Strength 5/5 B/L upper and lower extremities. Sensation equal/intact b/l  CHEST/LUNG: Clear to auscultation bilaterally; No rales, rhonchi, wheezing, or rubs  HEART: Regular rate and rhythm; No murmurs, rubs, or gallops  ABDOMEN: Soft, Nontender, Nondistended; Bowel sounds present. No guarding, rebound tenderness, or rigidity.  EXTREMITIES: 2+ Peripheral Pulses, No edema/warmth/erythema/tenderness to palpation b/l LE  MUSCULOSKELETAL: no spinal/paraspinal, L hip, or L lateral thigh tenderness to palpation (chaperoned by bedside RN in ED w/ pt permission) and w/o ecchymosis/hematoma

## 2025-02-20 NOTE — DISCHARGE NOTE PROVIDER - NSDCMRMEDTOKEN_GEN_ALL_CORE_FT
atorvastatin 10 mg oral tablet: 1 tab(s) orally once a day  cephalexin 250 mg oral capsule: 1 cap(s) orally once a day  Myrbetriq 25 mg oral tablet, extended release: 1 tab(s) orally once a day   acetaminophen 325 mg oral tablet: 3 tab(s) orally every 8 hours  atorvastatin 10 mg oral tablet: 1 tab(s) orally once a day  cephalexin 250 mg oral capsule: 1 cap(s) orally once a day  Home PT: Evaluate and Treat  Myrbetriq 25 mg oral tablet, extended release: 1 tab(s) orally once a day  oxyCODONE 5 mg oral tablet: 1 tab(s) orally every 6 hours as needed for  severe pain MDD: 20mg

## 2025-02-20 NOTE — DISCHARGE NOTE NURSING/CASE MANAGEMENT/SOCIAL WORK - PATIENT PORTAL LINK FT
You can access the FollowMyHealth Patient Portal offered by [facility] by registering at the following website: [email or web address]. By joining [facility]’s FollowMyHealth portal, you will also be able to view your health information using other applications (apps) compatible with our system.

## 2025-02-20 NOTE — DISCHARGE NOTE PROVIDER - CARE PROVIDERS DIRECT ADDRESSES
,DirectAddress_Unknown,[email or web address],DirectAddress_Unknown,DirectAddress_Unknown

## 2025-02-20 NOTE — DISCHARGE NOTE PROVIDER - CARE PROVIDER_API CALL
Robin Luong  Gastroenterology  [address] [place] [address]  Phone: [phone]  Fax: [phone]  Follow Up Time: 1 week    Castillo Schmid  Internal Medicine  [address] [facility]  [place] [address]  Phone: [phone]  Fax: [phone]  Follow Up Time: 1 week    Vaughn Zavala  Surgery  [address] [place] [id number]  Phone: [phone]  Fax: [phone]  Follow Up Time: 1 week    Rob Rodriguez  Pain Medicine  [address] [place] [id number]  Phone: [phone]  Fax: [phone]  Follow Up Time: 2 weeks

## 2025-02-20 NOTE — DISCHARGE NOTE PROVIDER - NSDCCPCAREPLAN_GEN_ALL_CORE_FT
PRINCIPAL DISCHARGE DIAGNOSIS  Diagnosis: Back pain  Assessment and Plan of Treatment: You came in for severe back pain, X rays were normal and did not show any fractures. You should see your primary care doctor and a pain specialist for further care. You should also continue physical therapy. For pain you can take oxycodone 5mg every 6 hours AS NEEDED FOR SEVERE PAIN. You can take tylenol and alternate it with ibuprofen 400mg (be sure to take  ibuprofen with food and water).

## 2025-02-20 NOTE — DISCHARGE NOTE PROVIDER - HOSPITAL COURSE
89F former smoker (24 pack-years, quit age 42), PMH HLD, OA, sciatica, L femur fracture s/p repair (8 years ago), pessary placement, recurrent UTI (on daily cephalexin PPx) p/w L lumbar back pain radiating to L gluteus and lateral LLE    #Lumbar back pain with radiculopathy affecting left lower extremity.   - no numbness (including saddle anesthesia), tingling, weakness, urinary/fecal incontinence, and currently not in pain at rest. Reports that she has been able to ambulate.  - xray lspine and pelvic is normal   - s/p L transgluteal sciatic nerve block under US guidance   - PT evaluation - [facility] declined, will get script for outpatient pT   - pain control with tylenol standing and PRN oxy - will give oxycodon for 3 day total     #Encephalopathy.   - more forgetful/repeating self which is new per son/patient, attributed by them to poor sleep x 5 days   - w/o focal deficit  - CTH non-con normal   - now AOx3    #HLD (hyperlipidemia).   - c/w statin.    #Recurrent UTI.   - w/o urinary sx at this time  - c/w home cephalexin.

## 2025-02-20 NOTE — PHYSICAL THERAPY INITIAL EVALUATION ADULT - PERTINENT HX OF CURRENT PROBLEM, REHAB EVAL
89F former smoker (24 pack-years, quit age 42), PMH HLD, OA, sciatica, L femur fracture s/p repair (8 years ago), pessary placement, recurrent UTI (on daily cephalexin PPx) p/w L lumbar back pain radiating to L gluteus and lateral LLE. XR L/S Vertebral body heights are intact. XR Pelvis  No acute fractureor dislocation. CTH No hydrocephalus, acute intracranial hemorrhage, mass effect, or brain edema.

## 2025-02-20 NOTE — DISCHARGE NOTE NURSING/CASE MANAGEMENT/SOCIAL WORK - FINANCIAL ASSISTANCE
[facility] provides services at a reduced cost to those who are determined to be eligible through [facility]’s financial assistance program. Information regarding [facility]’s financial assistance program can be found by going to [email or web address] or by calling [phone].

## 2025-02-20 NOTE — DISCHARGE NOTE NURSING/CASE MANAGEMENT/SOCIAL WORK - NSDCPEFALRISK_GEN_ALL_CORE
For information on Fall & Injury Prevention, visit: [email or web address] OR  [email or web address] OR  https://www.cdc.gov/steadi/patient.html

## 2025-02-20 NOTE — DISCHARGE NOTE PROVIDER - PROVIDER TOKENS
PROVIDER:[TOKEN:[[id number]],FOLLOWUP:[1 week]],PROVIDER:[TOKEN:[[id number]],FOLLOWUP:[1 week]],PROVIDER:[TOKEN:[[id number]],FOLLOWUP:[1 week]],PROVIDER:[TOKEN:[[id number]],FOLLOWUP:[2 weeks]]

## 2025-02-20 NOTE — DISCHARGE NOTE PROVIDER - NSDCFUADDAPPT_GEN_ALL_CORE_FT
APPTS ARE READY TO BE MADE: [X] YES    Best Family or Patient Contact (if needed):    Additional Information about above appointments (if needed):    1:   2:   3:     Other comments or requests:    APPTS ARE READY TO BE MADE: [X] YES    Best Family or Patient Contact (if needed):    Additional Information about above appointments (if needed):    1:   2:   3:     Other comments or requests:   Patient advised they did not want to proceed with scheduling appointments with the providers on their referrals. They will coordinate care on their own.

## 2025-02-20 NOTE — DISCHARGE NOTE PROVIDER - NSDCCPTREATMENT_GEN_ALL_CORE_FT
PRINCIPAL PROCEDURE  Procedure: XR lumbar spine 3V  Findings and Treatment: COMPARISON: None available  FINDINGS: No acute fracture or dislocation. No widening of the bilateral   SI joints and pubic symphysis. Status post left femoral jr and pin   fixation. Mild symmetric narrowing of the bilateral hip joints.  IMPRESSION: No acute fractureor dislocation.

## 2025-02-20 NOTE — PHYSICAL THERAPY INITIAL EVALUATION ADULT - ADDITIONAL COMMENTS
Pt lives in a private home with spouse, 3 steps to enter, first floor set up, son lives upstairs. Pt performed ADL/IADLs independently. Ambulates with rolling walker when needed. Owns DME: rolling walker

## 2025-02-20 NOTE — PHYSICAL THERAPY INITIAL EVALUATION ADULT - DID THE PATIENT HAVE SURGERY?
2/16/2024    Rony Gaffney  [address]  [address] [place] [id number]      RE:  Establishing primary care physician    Dear Rony    Our records show that Dr. Chad Flynn is listed as your primary care physician. At this time, Dr. Chad Flynn is no longer providing primary care services at [facility]. So that we may continue to address your wellness and health care needs, please call our center at [phone] to schedule an office visit with a new primary care physician.     If you have already changed your primary care physician, please notify our office at [phone] so we may update our records.     Sincerely,       [facility]   [address] [address]  Dept: [phone]  Dept Fax: [phone]      
n/a

## 2025-03-12 ENCOUNTER — NON-APPOINTMENT (OUTPATIENT)
Age: 89
End: 2025-03-12

## 2025-06-19 NOTE — PATIENT PROFILE ADULT - FALL HARM RISK - CONCLUSION
June 19, 2025     Denise Chavarria MD  [address]  [facility], [facility] [address]  [facility] [address]    Patient: Melissa Hand   YOB: 1955   Date of Visit: 6/19/2025       Dear Dr. Denise Chavarria MD:    Thank you for referring Melissa Hand to me for evaluation. Below are my notes for this consultation.  If you have questions, please do not hesitate to call me. I look forward to following your patient along with you.       Sincerely,     Rai Veras MD      CC: No Recipients  ______________________________________________________________________________________    History Of Present Illness  Melissa Hand is a 69 y.o. female presenting with a left spigelian hernia.  She has noticed this bulge.  She has had previous appendectomy.  She had a previous incarcerated hernia in her appendectomy incision.  She subsequently had an abdominoplasty.  She still works as a radiology technician.  She had routine colonoscopies.  She has noticed this bulge at time when she has constipation and gets worse.  She has no history of coronary artery disease.        Last Recorded Vitals  Blood pressure 117/72, pulse 84, temperature 35.9 °C (96.6 °F), weight 71.9 kg (158 lb 8 oz).  Physical Examination  Awake and alert.  Normal respiration.  Abdominal exam she does have the abdominoplasty incisions.  This took where previous appendectomy incision and repair.  He can palpate a spigelian hernia on the left side.      Relevant Results  I reviewed the CT scan with her.  We could see the spigelian hernia.  And sizing is 4 cm x 3 cm    Assessment/Planspigelian hernia.  I reviewed the hernia booklets with her.  Will plan a laparoscopic repair of the spigelian hernia at her convenience.  She agrees with this plan.    Rai Veras MD FACS  Professor of Surgery  Redd Medina Chair in Surgical [place]  [facility]  [facility]  [id number] [place] [address], [id number]  Phone [phone]  email: [email or web address]       Fall with Harm Risk